# Patient Record
Sex: FEMALE | Race: WHITE | NOT HISPANIC OR LATINO | Employment: FULL TIME | URBAN - METROPOLITAN AREA
[De-identification: names, ages, dates, MRNs, and addresses within clinical notes are randomized per-mention and may not be internally consistent; named-entity substitution may affect disease eponyms.]

---

## 2024-07-12 ENCOUNTER — OFFICE VISIT (OUTPATIENT)
Dept: ENDOCRINOLOGY | Facility: CLINIC | Age: 54
End: 2024-07-12
Payer: COMMERCIAL

## 2024-07-12 VITALS
SYSTOLIC BLOOD PRESSURE: 110 MMHG | WEIGHT: 268 LBS | HEART RATE: 92 BPM | HEIGHT: 67 IN | BODY MASS INDEX: 42.06 KG/M2 | OXYGEN SATURATION: 99 % | DIASTOLIC BLOOD PRESSURE: 78 MMHG

## 2024-07-12 DIAGNOSIS — R73.03 PREDIABETES: ICD-10-CM

## 2024-07-12 DIAGNOSIS — E05.90 HYPERTHYROIDISM: Primary | ICD-10-CM

## 2024-07-12 LAB
BASOPHILS # BLD AUTO: 0.09 10*3/MM3 (ref 0–0.2)
BASOPHILS NFR BLD AUTO: 0.5 % (ref 0–1.5)
DEPRECATED RDW RBC AUTO: 40.5 FL (ref 37–54)
EOSINOPHIL # BLD AUTO: 0.19 10*3/MM3 (ref 0–0.4)
EOSINOPHIL NFR BLD AUTO: 1.1 % (ref 0.3–6.2)
ERYTHROCYTE [DISTWIDTH] IN BLOOD BY AUTOMATED COUNT: 12.8 % (ref 12.3–15.4)
HBA1C MFR BLD: 6 % (ref 4.8–5.6)
HCT VFR BLD AUTO: 41.8 % (ref 34–46.6)
HGB BLD-MCNC: 13.7 G/DL (ref 12–15.9)
IMM GRANULOCYTES # BLD AUTO: 0.07 10*3/MM3 (ref 0–0.05)
IMM GRANULOCYTES NFR BLD AUTO: 0.4 % (ref 0–0.5)
LYMPHOCYTES # BLD AUTO: 4.24 10*3/MM3 (ref 0.7–3.1)
LYMPHOCYTES NFR BLD AUTO: 25.5 % (ref 19.6–45.3)
MCH RBC QN AUTO: 28.7 PG (ref 26.6–33)
MCHC RBC AUTO-ENTMCNC: 32.8 G/DL (ref 31.5–35.7)
MCV RBC AUTO: 87.4 FL (ref 79–97)
MONOCYTES # BLD AUTO: 1.11 10*3/MM3 (ref 0.1–0.9)
MONOCYTES NFR BLD AUTO: 6.7 % (ref 5–12)
NEUTROPHILS NFR BLD AUTO: 10.91 10*3/MM3 (ref 1.7–7)
NEUTROPHILS NFR BLD AUTO: 65.8 % (ref 42.7–76)
NRBC BLD AUTO-RTO: 0 /100 WBC (ref 0–0.2)
PLATELET # BLD AUTO: 338 10*3/MM3 (ref 140–450)
PMV BLD AUTO: 10.1 FL (ref 6–12)
RBC # BLD AUTO: 4.78 10*6/MM3 (ref 3.77–5.28)
WBC NRBC COR # BLD AUTO: 16.61 10*3/MM3 (ref 3.4–10.8)

## 2024-07-12 PROCEDURE — 84439 ASSAY OF FREE THYROXINE: CPT | Performed by: PHYSICIAN ASSISTANT

## 2024-07-12 PROCEDURE — 83036 HEMOGLOBIN GLYCOSYLATED A1C: CPT | Performed by: PHYSICIAN ASSISTANT

## 2024-07-12 PROCEDURE — 86376 MICROSOMAL ANTIBODY EACH: CPT | Performed by: PHYSICIAN ASSISTANT

## 2024-07-12 PROCEDURE — 84445 ASSAY OF TSI GLOBULIN: CPT | Performed by: PHYSICIAN ASSISTANT

## 2024-07-12 PROCEDURE — 83520 IMMUNOASSAY QUANT NOS NONAB: CPT | Performed by: PHYSICIAN ASSISTANT

## 2024-07-12 PROCEDURE — 86800 THYROGLOBULIN ANTIBODY: CPT | Performed by: PHYSICIAN ASSISTANT

## 2024-07-12 PROCEDURE — 80050 GENERAL HEALTH PANEL: CPT | Performed by: PHYSICIAN ASSISTANT

## 2024-07-12 PROCEDURE — 84480 ASSAY TRIIODOTHYRONINE (T3): CPT | Performed by: PHYSICIAN ASSISTANT

## 2024-07-12 RX ORDER — ATORVASTATIN CALCIUM 10 MG/1
10 TABLET, FILM COATED ORAL DAILY
COMMUNITY

## 2024-07-12 RX ORDER — NORTRIPTYLINE HYDROCHLORIDE 25 MG/1
25 CAPSULE ORAL 2 TIMES DAILY
COMMUNITY

## 2024-07-12 NOTE — LETTER
July 12, 2024     Quinton Koenig MD  [address] [place] [id number]    Patient: Fariba Delcid   YOB: 1970   Date of Visit: 7/12/2024     Dear Quinton Koenig MD:       Thank you for referring Fariba Delcid to me for evaluation. Below are the relevant portions of my assessment and plan of care.    If you have questions, please do not hesitate to call me. I look forward to following Fariba along with you.         Sincerely,        Kiara Dejesus PA-C        CC: No Recipients    Kiara Dejesus PA-C  07/12/24 1327  Sign when Signing Visit      Chief Complaint:   Fariba Delcid is a 54 y.o. female who is being seen today for hyperthyroidism. Referred by Quinton Koenig MD.     HPI     54 y.o. female with hyperlipidemia, ocular histoplasmosis, and hx of gastric sleeve and hx of gastrointestinal stromal tumor presents for consultation regarding hyperthyroidism.     She had gastric sleeve 12/2022. She was losing weight and feeling well, but weight loss has now stalled and she is very fatigued since around Nov 2023. Energy is very poor to the point she has to take naps. It has also kept her from exercising, which she normally does. She had her thyroid checked due the new onset fatigue and was found to have a TSH of 0.05 (3/2024).   She chronically has a high heart rate.  She gets tremors at night sometime when she feels like she needs a snack. This used to happen occasional but now happens 3-4 times per week. She wakes up shaky and nauseous and feels like she has to eat. She usually eats 1/2 a protein bar and that helps.   No unintentional weight loss.  No increase in appetite.   No diarrhea.   No changes in vision.   She has had leg cramps.   She reports some swelling in glands of neck. These vary in size from time to time.   Has not been on iodine supplements or amiodarone.     She has lost at total of 65 pounds following gastric sleeve.  During her sleeve surgery she was noted to have a GI  stromal tumor. It was removed and was found to be benign. She has a yearly abdominal CT scan now.     A1c is in the prediabetes range at 5.7. Previously as high as 9s, but improve very quickly following gastric sleeve and removal of stromal tumor.    Family hx of thyroid cancer: no, mom has multiple thyroid nodule with benign biopsies    Tsh 0.05, 3/2024    The following portions of the patient's history were reviewed and updated as appropriate: allergies, current medications, past family history, past medical history, past social history, past surgical history and problem list.    Review of Systems  Review of Systems   Constitutional:  Positive for fatigue.        Weight plateau    All other systems reviewed and are negative.       Current medications:  Current Outpatient Medications   Medication Sig Dispense Refill   • ALBUTEROL SULFATE HFA IN Inhale As Needed.     • atorvastatin (LIPITOR) 10 MG tablet Take 1 tablet by mouth Daily.     • nortriptyline (PAMELOR) 25 MG capsule Take 1 capsule by mouth 2 (Two) Times a Day.       No current facility-administered medications for this visit.       Physical Exam   Vitals:    07/12/24 1115   BP: 110/78   Pulse: 92   SpO2: 99%   Body mass index is 41.97 kg/m².  Physical Exam  Constitutional:       General: She is not in acute distress.     Appearance: She is well-developed. She is not diaphoretic.   Eyes:      General: Lids are normal.   Neck:      Thyroid: No thyroid mass or thyromegaly.      Vascular: No JVD.      Trachea: No tracheal deviation.   Cardiovascular:      Rate and Rhythm: Normal rate and regular rhythm.   Pulmonary:      Effort: Pulmonary effort is normal.      Breath sounds: Normal breath sounds.   Musculoskeletal:         General: No tenderness.   Lymphadenopathy:      Cervical: No cervical adenopathy.   Skin:     General: Skin is warm and dry.      Findings: No erythema or rash.   Neurological:      Mental Status: She is alert and oriented to person,  "place, and time.   Psychiatric:         Speech: Speech normal.         Behavior: Behavior normal.         Thought Content: Thought content normal.         Labs and Imaging   No results found for: \"TSH\", \"V7ZIJYV\", \"X1UVMVF\", \"THYROIDAB\"        External records reviewed  3/2024 - tsh 0.05  3/28/24-right thyroid lobe measures 4.2 x 2.2 x 1.6 cm has normal background thyroid echogenicity, there is a 1.0 x 0.9 x 0.6 cm solid hypoechoic right thyroid nodule TI-RADS 4.  Isthmus is 2 mm, the left lobe measures 4.8 x 1.9 x 1.6 cm with normal echogenicity and morphology without mass    Assessment / Plan     Diagnoses and all orders for this visit:    1. Hyperthyroidism (Primary)  -     TSH  -     T4, Free  -     T3  -     Thyroid Stimulating Immunoglobulin  -     Thyrotropin Receptor Antibody  -     Thyroid Antibodies  -     Comprehensive Metabolic Panel  -     CBC & Differential    2. Prediabetes  -     Hemoglobin A1c        Problem List Items Addressed This Visit    None  Visit Diagnoses       Hyperthyroidism    -  Primary    Relevant Orders    TSH    T4, Free    T3    Thyroid Stimulating Immunoglobulin    Thyrotropin Receptor Antibody    Thyroid Antibodies    Comprehensive Metabolic Panel    CBC & Differential    Prediabetes        Relevant Orders    Hemoglobin A1c          Diagnosis was discussed and reviewed with the patient including the advantages of drug therapy.        Reviewed TSH of 0.05 from 3/2024. Discussed possible etiologies of hyperthyroidism including thyroiditis, Graves' disease, and toxic nodule. Repeat TSH, add thyroid hormones and check thyroid antibodies. She is not overly symptomatic so will hold off on propranolol for now. Further plan will be formulated once lab results are reviewed.     Thyroid nodule - 1 cm TI-RADS nodule meets criteria for following (biopsy if 1.5 cm or greater). Will plan for repeat u/s with Dr. Hinton 3/2025    Prediabetes - with hx of type 2 diabetes before gastric sleeve, " repeat A1c. Will check cgm to assess her episodes at night that sound like hypoglycemia.     F/u 2 months    Signed by: Kiara Dejesus PA-C  Endocrinology  07/12/2024

## 2024-07-12 NOTE — PROGRESS NOTES
Chief Complaint:   Fariba Delcid is a 54 y.o. female who is being seen today for hyperthyroidism. Referred by Quinton Koenig MD.     HPI     54 y.o. female with hyperlipidemia, ocular histoplasmosis, and hx of gastric sleeve and hx of gastrointestinal stromal tumor presents for consultation regarding hyperthyroidism.     She had gastric sleeve 12/2022. She was losing weight and feeling well, but weight loss has now stalled and she is very fatigued since around Nov 2023. Energy is very poor to the point she has to take naps. It has also kept her from exercising, which she normally does. She had her thyroid checked due the new onset fatigue and was found to have a TSH of 0.05 (3/2024).   She chronically has a high heart rate.  She gets tremors at night sometime when she feels like she needs a snack. This used to happen occasional but now happens 3-4 times per week. She wakes up shaky and nauseous and feels like she has to eat. She usually eats 1/2 a protein bar and that helps.   No unintentional weight loss.  No increase in appetite.   No diarrhea.   No changes in vision.   She has had leg cramps.   She reports some swelling in glands of neck. These vary in size from time to time.   Has not been on iodine supplements or amiodarone.     She has lost at total of 65 pounds following gastric sleeve.  During her sleeve surgery she was noted to have a GI stromal tumor. It was removed and was found to be benign. She has a yearly abdominal CT scan now.     A1c is in the prediabetes range at 5.7. Previously as high as 9s, but improve very quickly following gastric sleeve and removal of stromal tumor.    Family hx of thyroid cancer: no, mom has multiple thyroid nodule with benign biopsies    Tsh 0.05, 3/2024    The following portions of the patient's history were reviewed and updated as appropriate: allergies, current medications, past family history, past medical history, past social history, past surgical history and  "problem list.    Review of Systems  Review of Systems   Constitutional:  Positive for fatigue.        Weight plateau    All other systems reviewed and are negative.       Current medications:  Current Outpatient Medications   Medication Sig Dispense Refill    ALBUTEROL SULFATE HFA IN Inhale As Needed.      atorvastatin (LIPITOR) 10 MG tablet Take 1 tablet by mouth Daily.      nortriptyline (PAMELOR) 25 MG capsule Take 1 capsule by mouth 2 (Two) Times a Day.       No current facility-administered medications for this visit.       Physical Exam   Vitals:    07/12/24 1115   BP: 110/78   Pulse: 92   SpO2: 99%   Body mass index is 41.97 kg/m².  Physical Exam  Constitutional:       General: She is not in acute distress.     Appearance: She is well-developed. She is not diaphoretic.   Eyes:      General: Lids are normal.   Neck:      Thyroid: No thyroid mass or thyromegaly.      Vascular: No JVD.      Trachea: No tracheal deviation.   Cardiovascular:      Rate and Rhythm: Normal rate and regular rhythm.   Pulmonary:      Effort: Pulmonary effort is normal.      Breath sounds: Normal breath sounds.   Musculoskeletal:         General: No tenderness.   Lymphadenopathy:      Cervical: No cervical adenopathy.   Skin:     General: Skin is warm and dry.      Findings: No erythema or rash.   Neurological:      Mental Status: She is alert and oriented to person, place, and time.   Psychiatric:         Speech: Speech normal.         Behavior: Behavior normal.         Thought Content: Thought content normal.         Labs and Imaging   No results found for: \"TSH\", \"R7KXMNI\", \"P7XLLMG\", \"THYROIDAB\"        External records reviewed  3/2024 - tsh 0.05  3/28/24-right thyroid lobe measures 4.2 x 2.2 x 1.6 cm has normal background thyroid echogenicity, there is a 1.0 x 0.9 x 0.6 cm solid hypoechoic right thyroid nodule TI-RADS 4.  Isthmus is 2 mm, the left lobe measures 4.8 x 1.9 x 1.6 cm with normal echogenicity and morphology without " mass    Assessment / Plan     Diagnoses and all orders for this visit:    1. Hyperthyroidism (Primary)  -     TSH  -     T4, Free  -     T3  -     Thyroid Stimulating Immunoglobulin  -     Thyrotropin Receptor Antibody  -     Thyroid Antibodies  -     Comprehensive Metabolic Panel  -     CBC & Differential    2. Prediabetes  -     Hemoglobin A1c        Problem List Items Addressed This Visit    None  Visit Diagnoses       Hyperthyroidism    -  Primary    Relevant Orders    TSH    T4, Free    T3    Thyroid Stimulating Immunoglobulin    Thyrotropin Receptor Antibody    Thyroid Antibodies    Comprehensive Metabolic Panel    CBC & Differential    Prediabetes        Relevant Orders    Hemoglobin A1c          Diagnosis was discussed and reviewed with the patient including the advantages of drug therapy.        Reviewed TSH of 0.05 from 3/2024. Discussed possible etiologies of hyperthyroidism including thyroiditis, Graves' disease, and toxic nodule. Repeat TSH, add thyroid hormones and check thyroid antibodies. She is not overly symptomatic so will hold off on propranolol for now. Further plan will be formulated once lab results are reviewed.     Thyroid nodule - 1 cm TI-RADS nodule meets criteria for following (biopsy if 1.5 cm or greater). Will plan for repeat u/s with Dr. Hinton 3/2025    Prediabetes - with hx of type 2 diabetes before gastric sleeve, repeat A1c. Will check cgm to assess her episodes at night that sound like hypoglycemia.     F/u 2 months    Signed by: Kiara Dejesus PA-C  Endocrinology  07/12/2024

## 2024-07-13 LAB
ALBUMIN SERPL-MCNC: 3.7 G/DL (ref 3.5–5.2)
ALBUMIN/GLOB SERPL: 1.4 G/DL
ALP SERPL-CCNC: 72 U/L (ref 39–117)
ALT SERPL W P-5'-P-CCNC: 34 U/L (ref 1–33)
ANION GAP SERPL CALCULATED.3IONS-SCNC: 11 MMOL/L (ref 5–15)
AST SERPL-CCNC: 21 U/L (ref 1–32)
BILIRUB SERPL-MCNC: 0.2 MG/DL (ref 0–1.2)
BUN SERPL-MCNC: 24 MG/DL (ref 6–20)
BUN/CREAT SERPL: 34.3 (ref 7–25)
CALCIUM SPEC-SCNC: 8.9 MG/DL (ref 8.6–10.5)
CHLORIDE SERPL-SCNC: 107 MMOL/L (ref 98–107)
CO2 SERPL-SCNC: 24 MMOL/L (ref 22–29)
CREAT SERPL-MCNC: 0.7 MG/DL (ref 0.57–1)
EGFRCR SERPLBLD CKD-EPI 2021: 102.3 ML/MIN/1.73
GLOBULIN UR ELPH-MCNC: 2.6 GM/DL
GLUCOSE SERPL-MCNC: 114 MG/DL (ref 65–99)
POTASSIUM SERPL-SCNC: 3.8 MMOL/L (ref 3.5–5.2)
PROT SERPL-MCNC: 6.3 G/DL (ref 6–8.5)
SODIUM SERPL-SCNC: 142 MMOL/L (ref 136–145)
T3 SERPL-MCNC: 88.5 NG/DL (ref 80–200)
T4 FREE SERPL-MCNC: 1.56 NG/DL (ref 0.92–1.68)
TSH SERPL DL<=0.05 MIU/L-ACNC: 2.45 UIU/ML (ref 0.27–4.2)

## 2024-07-14 LAB — TSH RECEP AB SER-ACNC: <1.1 IU/L (ref 0–1.75)

## 2024-07-15 LAB
THYROGLOB AB SERPL-ACNC: <1 IU/ML (ref 0–0.9)
THYROPEROXIDASE AB SERPL-ACNC: <9 IU/ML (ref 0–34)

## 2024-07-17 LAB — TSI SER-ACNC: <0.1 IU/L (ref 0–0.55)

## 2024-07-25 ENCOUNTER — TELEPHONE (OUTPATIENT)
Dept: ENDOCRINOLOGY | Facility: CLINIC | Age: 54
End: 2024-07-25

## 2024-07-25 NOTE — TELEPHONE ENCOUNTER
----- Message from Kiara Dejesus sent at 7/24/2024  9:58 AM EDT -----  Regarding: cgm report  She is wearing a sample cgm (I think logan). Please print her report.

## 2024-07-29 NOTE — TELEPHONE ENCOUNTER
Please call ptLuisa Bradley reviewed.  BG at night is around 80 without concerning hypoglycemia.  During the day, occasional episode of hypoglycemia or near hypoglycemia following carb consumption. Would make sure to eat protein any time carbs are being eaten and always decrease overall carb/sugar intake. This should help with the postprandial lows or near lows.

## 2025-03-28 ENCOUNTER — OFFICE VISIT (OUTPATIENT)
Dept: ENDOCRINOLOGY | Facility: CLINIC | Age: 55
End: 2025-03-28
Payer: COMMERCIAL

## 2025-03-28 VITALS
DIASTOLIC BLOOD PRESSURE: 72 MMHG | WEIGHT: 267 LBS | SYSTOLIC BLOOD PRESSURE: 120 MMHG | HEART RATE: 92 BPM | BODY MASS INDEX: 41.91 KG/M2 | OXYGEN SATURATION: 98 % | HEIGHT: 67 IN

## 2025-03-28 DIAGNOSIS — E05.90 SUBCLINICAL HYPERTHYROIDISM: Primary | ICD-10-CM

## 2025-03-28 DIAGNOSIS — E04.1 SOLITARY THYROID NODULE: ICD-10-CM

## 2025-03-28 RX ORDER — TIRZEPATIDE 5 MG/.5ML
5 INJECTION, SOLUTION SUBCUTANEOUS WEEKLY
COMMUNITY
Start: 2024-10-08

## 2025-03-28 NOTE — PROGRESS NOTES
"Thyroid Problem (Hyperthyroidism/)    Subjective   Fariba Delcid is a 54 y.o. female. she is here today for follow-up . She has seen [facility] of subclinical hyperthyroidism  Thyroid nodule dx in approximately a year ago.  ReSound showed 1 cm right thyroid nodule T RADS 4.  Follow-up ultrasound in a year was recommended.   Patient completed blood test recently and her TSH was 0.11.  3/2024 TSH was 0.05, repeat labs 7/24 showed negative thyroid antibodies and normal thyroid function.   She has lost at total of 65 pounds following gastric sleeve.   A1c was in the prediabetes range at 5.7. Previously as high as 9s, but improve very quickly following bariatric surgery  Recent A1C was 5.2     She reported episodes of shakiness and dizziness during the day.  Drinking water and eating a snack resolves the symptoms  She is taking Mounjaro 5 mg weekly, no side effects       Medications:    Current Outpatient Medications:     ALBUTEROL SULFATE HFA IN, Inhale As Needed., Disp: , Rfl:     atorvastatin (LIPITOR) 10 MG tablet, Take 1 tablet by mouth Daily., Disp: , Rfl:     Mounjaro 5 MG/0.5ML solution auto-injector, Inject 5 mg under the skin into the appropriate area as directed 1 (One) Time Per Week., Disp: , Rfl:     nortriptyline (PAMELOR) 25 MG capsule, Take 1 capsule by mouth 2 (Two) Times a Day., Disp: , Rfl:       Review of Systems   Constitutional:  Positive for diaphoresis and fatigue.   Neurological:  Positive for dizziness and light-headedness.       Objective   Vitals:    03/28/25 1028   BP: 120/72   BP Location: Left arm   Patient Position: Sitting   Cuff Size: Adult   Pulse: 92   SpO2: 98%   Weight: 121 kg (267 lb)   Height: 170.2 cm (67\")    Body mass index is 41.82 kg/m².   Physical Exam  Vitals reviewed.   Constitutional:       Appearance: Normal appearance.   Neck:      Thyroid: Thyromegaly present.   Cardiovascular:      Rate and Rhythm: Regular rhythm. Tachycardia present.      Pulses: Normal " pulses.      Heart sounds: Normal heart sounds.   Pulmonary:      Effort: Pulmonary effort is normal.      Breath sounds: Normal breath sounds.   Musculoskeletal:         General: No swelling.   Neurological:      Mental Status: She is alert and oriented to person, place, and time.   Psychiatric:         Mood and Affect: Mood normal.         Thought Content: Thought content normal.           Results for orders placed or performed in visit on 07/12/24   TSH    Collection Time: 07/12/24 11:56 AM    Specimen: Arm, Left; Blood   Result Value Ref Range    TSH 2.450 0.270 - 4.200 uIU/mL   T4, Free    Collection Time: 07/12/24 11:56 AM    Specimen: Arm, Left; Blood   Result Value Ref Range    Free T4 1.56 0.92 - 1.68 ng/dL   T3    Collection Time: 07/12/24 11:56 AM    Specimen: Arm, Left; Blood   Result Value Ref Range    T3, Total 88.5 80.0 - 200.0 ng/dl   Thyroid Stimulating Immunoglobulin    Collection Time: 07/12/24 11:56 AM    Specimen: Arm, Left; Blood   Result Value Ref Range    Thyroid Stimulating Immunoglobulin <0.10 0.00 - 0.55 IU/L   Thyrotropin Receptor Antibody    Collection Time: 07/12/24 11:56 AM    Specimen: Arm, Left; Blood   Result Value Ref Range    Thyrotropin Receptor Antibody <1.10 0.00 - 1.75 IU/L   Thyroid Antibodies    Collection Time: 07/12/24 11:56 AM    Specimen: Arm, Left; Blood   Result Value Ref Range    Thyroid Peroxidase Antibody <9 0 - 34 IU/mL    Thyroglobulin Ab <1.0 0.0 - 0.9 IU/mL   Hemoglobin A1c    Collection Time: 07/12/24 11:56 AM    Specimen: Arm, Left; Blood   Result Value Ref Range    Hemoglobin A1C 6.00 (H) 4.80 - 5.60 %   Comprehensive Metabolic Panel    Collection Time: 07/12/24 11:56 AM    Specimen: Arm, Left; Blood   Result Value Ref Range    Glucose 114 (H) 65 - 99 mg/dL    BUN 24 (H) 6 - 20 mg/dL    Creatinine 0.70 0.57 - 1.00 mg/dL    Sodium 142 136 - 145 mmol/L    Potassium 3.8 3.5 - 5.2 mmol/L    Chloride 107 98 - 107 mmol/L    CO2 24.0 22.0 - 29.0 mmol/L    Calcium  8.9 8.6 - 10.5 mg/dL    Total Protein 6.3 6.0 - 8.5 g/dL    Albumin 3.7 3.5 - 5.2 g/dL    ALT (SGPT) 34 (H) 1 - 33 U/L    AST (SGOT) 21 1 - 32 U/L    Alkaline Phosphatase 72 39 - 117 U/L    Total Bilirubin 0.2 0.0 - 1.2 mg/dL    Globulin 2.6 gm/dL    A/G Ratio 1.4 g/dL    BUN/Creatinine Ratio 34.3 (H) 7.0 - 25.0    Anion Gap 11.0 5.0 - 15.0 mmol/L    eGFR 102.3 >60.0 mL/min/1.73   CBC Auto Differential    Collection Time: 07/12/24 11:56 AM    Specimen: Arm, Left; Blood   Result Value Ref Range    WBC 16.61 (H) 3.40 - 10.80 10*3/mm3    RBC 4.78 3.77 - 5.28 10*6/mm3    Hemoglobin 13.7 12.0 - 15.9 g/dL    Hematocrit 41.8 34.0 - 46.6 %    MCV 87.4 79.0 - 97.0 fL    MCH 28.7 26.6 - 33.0 pg    MCHC 32.8 31.5 - 35.7 g/dL    RDW 12.8 12.3 - 15.4 %    RDW-SD 40.5 37.0 - 54.0 fl    MPV 10.1 6.0 - 12.0 fL    Platelets 338 140 - 450 10*3/mm3    Neutrophil % 65.8 42.7 - 76.0 %    Lymphocyte % 25.5 19.6 - 45.3 %    Monocyte % 6.7 5.0 - 12.0 %    Eosinophil % 1.1 0.3 - 6.2 %    Basophil % 0.5 0.0 - 1.5 %    Immature Grans % 0.4 0.0 - 0.5 %    Neutrophils, Absolute 10.91 (H) 1.70 - 7.00 10*3/mm3    Lymphocytes, Absolute 4.24 (H) 0.70 - 3.10 10*3/mm3    Monocytes, Absolute 1.11 (H) 0.10 - 0.90 10*3/mm3    Eosinophils, Absolute 0.19 0.00 - 0.40 10*3/mm3    Basophils, Absolute 0.09 0.00 - 0.20 10*3/mm3    Immature Grans, Absolute 0.07 (H) 0.00 - 0.05 10*3/mm3    nRBC 0.0 0.0 - 0.2 /100 WBC             Thyroid ultrasound 03/28/25            Real time high resolution imaging of the thyroid gland was performed in transverse and longitudinal planes.   Previous images were reviewed and compared to the current appearance to assess stability.       The right lobe measured 4.65 cm L x 1.83 cm AP x 1.92 cm in TV dimension.    The isthmus measured 0.28 cm in thickness.    The left thyroid lobe measured 5.22 cm L x 1.72 cm AP x 1.71 cm in TV dimension.    Thyroid gland is homogeneous and contains single nodule in the right lobe.    Nodule 1   is  located in the right mid lobe and measures 0.64 x 0.55 x 0.56 cm (L X AP X TV).  This nodule is solid, homogeneous, hypoechoic with well-defined margins, and Grade II vascularity on Color Flow Doppler.  It has no artifacts    No pathologic lymph nodes were seen.      Assessment: right thyroid nodule is decreased in size.   Repeat ultrasound in 1 year      Assessment/Plan:    Problem List Items Addressed This Visit    None  Visit Diagnoses         Subclinical hyperthyroidism    -  Primary    Relevant Orders    TSH    T4, Free    T3      Solitary thyroid nodule        Relevant Orders    US Thyroid (Completed)            Old records were reviewed and summarized in HPI section of the note.  Previous ultrasound report was reviewed and compared to current finding.   Thyroid nodule has decreased in size. Cont with yearly monitoring.   Subclinical hyperthyroidism - repeat TFT in 2-3 months. Lat labs showed elevated thyroid function and she has some sx that could be attributed to hyperthyroidism - tachycardia with negative cardiac workup.   Consider low dose methimazole if levels increase.     Return in about 6 months (around 9/28/2025) for 15 min appointment.